# Patient Record
Sex: FEMALE | Race: WHITE | NOT HISPANIC OR LATINO | Employment: FULL TIME | URBAN - METROPOLITAN AREA
[De-identification: names, ages, dates, MRNs, and addresses within clinical notes are randomized per-mention and may not be internally consistent; named-entity substitution may affect disease eponyms.]

---

## 2017-04-23 ENCOUNTER — HOSPITAL ENCOUNTER (EMERGENCY)
Facility: HOSPITAL | Age: 62
Discharge: HOME | End: 2017-04-23
Attending: EMERGENCY MEDICINE
Payer: MEDICAID

## 2017-04-23 VITALS
BODY MASS INDEX: 20.2 KG/M2 | HEIGHT: 61 IN | DIASTOLIC BLOOD PRESSURE: 63 MMHG | RESPIRATION RATE: 12 BRPM | SYSTOLIC BLOOD PRESSURE: 137 MMHG | OXYGEN SATURATION: 98 % | HEART RATE: 96 BPM | TEMPERATURE: 99 F | WEIGHT: 107 LBS

## 2017-04-23 DIAGNOSIS — L03.90 CELLULITIS, UNSPECIFIED CELLULITIS SITE: ICD-10-CM

## 2017-04-23 DIAGNOSIS — L02.11 ABSCESS OF NECK: Primary | ICD-10-CM

## 2017-04-23 PROCEDURE — 25000003 PHARM REV CODE 250: Performed by: PHYSICIAN ASSISTANT

## 2017-04-23 PROCEDURE — 99284 EMERGENCY DEPT VISIT MOD MDM: CPT | Mod: 25

## 2017-04-23 PROCEDURE — 10061 I&D ABSCESS COMP/MULTIPLE: CPT

## 2017-04-23 RX ORDER — HYDROCODONE BITARTRATE AND ACETAMINOPHEN 5; 325 MG/1; MG/1
1 TABLET ORAL 4 TIMES DAILY PRN
Qty: 8 TABLET | Refills: 0 | Status: SHIPPED | OUTPATIENT
Start: 2017-04-23

## 2017-04-23 RX ORDER — SULFAMETHOXAZOLE AND TRIMETHOPRIM 800; 160 MG/1; MG/1
1 TABLET ORAL 2 TIMES DAILY
Qty: 14 TABLET | Refills: 0 | Status: SHIPPED | OUTPATIENT
Start: 2017-04-23 | End: 2017-04-30

## 2017-04-23 RX ORDER — CLOPIDOGREL BISULFATE 75 MG/1
75 TABLET ORAL DAILY
COMMUNITY

## 2017-04-23 RX ORDER — LIDOCAINE HYDROCHLORIDE 10 MG/ML
10 INJECTION, SOLUTION EPIDURAL; INFILTRATION; INTRACAUDAL; PERINEURAL
Status: COMPLETED | OUTPATIENT
Start: 2017-04-23 | End: 2017-04-23

## 2017-04-23 RX ORDER — LIDOCAINE HYDROCHLORIDE 10 MG/ML
INJECTION, SOLUTION EPIDURAL; INFILTRATION; INTRACAUDAL; PERINEURAL
Status: DISCONTINUED
Start: 2017-04-23 | End: 2017-04-23 | Stop reason: WASHOUT

## 2017-04-23 RX ORDER — HYDROCODONE BITARTRATE AND ACETAMINOPHEN 5; 325 MG/1; MG/1
1 TABLET ORAL
Status: COMPLETED | OUTPATIENT
Start: 2017-04-23 | End: 2017-04-23

## 2017-04-23 RX ORDER — NAPROXEN SODIUM 220 MG/1
81 TABLET, FILM COATED ORAL DAILY
COMMUNITY

## 2017-04-23 RX ORDER — BUPROPION HYDROCHLORIDE 300 MG/1
300 TABLET ORAL DAILY
COMMUNITY

## 2017-04-23 RX ORDER — MUPIROCIN 20 MG/G
OINTMENT TOPICAL 3 TIMES DAILY
Qty: 30 G | Refills: 0 | Status: SHIPPED | OUTPATIENT
Start: 2017-04-23 | End: 2017-05-03

## 2017-04-23 RX ADMIN — LIDOCAINE HYDROCHLORIDE 100 MG: 10 INJECTION, SOLUTION EPIDURAL; INFILTRATION; INTRACAUDAL; PERINEURAL at 04:04

## 2017-04-23 RX ADMIN — HYDROCODONE BITARTRATE AND ACETAMINOPHEN 1 TABLET: 5; 325 TABLET ORAL at 04:04

## 2017-04-23 NOTE — ED AVS SNAPSHOT
[facility] [address] [place] [address]               Noemi Gamez   2017  4:11 PM   ED    Description:  Female : 1955   Department:  [facility]           Your Care was Coordinated By:     Provider Role From To    Van Page MD Attending Provider 17 5069 --    Earlene Bunch PA-C Physician Assistant 17 1620 --      Reason for Visit     Abscess           Diagnoses this Visit        Comments    Abscess of neck    -  Primary     Cellulitis, unspecified cellulitis site           ED Disposition     None           To Do List           Follow-up Information     Follow up with Steven Austin MD.    Specialty:  Family Medicine    Why:  for primary care evaluation     Contact information:    [address]  [address]  [place] [address]  [phone]          Follow up with [facility].    Specialty:  Emergency Medicine    Why:  As needed, If symptoms worsen    Contact information:    [address] [place] [id number] [phone]       These Medications        Disp Refills Start End    sulfamethoxazole-trimethoprim 800-160mg (BACTRIM DS) 800-160 mg Tab 14 tablet 0 2017    Take 1 tablet by mouth 2 (two) times daily. - Oral    hydrocodone-acetaminophen 5-325mg (NORCO) 5-325 mg per tablet 8 tablet 0 2017     Take 1 tablet by mouth 4 (four) times daily as needed for Pain. - Oral    mupirocin (BACTROBAN) 2 % ointment 30 g 0 2017 5/3/2017    Apply topically 3 (three) times daily. - Topical (Top)      [facility]     [facility] Care Line -  Assistance  Unless otherwise directed by your provider, please contact [facility], our nurse care line that is available for  assistance.     Registered nurses in the [facility] provide: appointment scheduling, clinical advisement, health education, and other  advisory services.  Call: [phone] (toll free)               Medications           Message regarding Medications     Verify the changes and/or additions to your medication regime listed below are the same as discussed with your clinician today.  If any of these changes or additions are incorrect, please notify your healthcare provider.        START taking these NEW medications        Refills    sulfamethoxazole-trimethoprim 800-160mg (BACTRIM DS) 800-160 mg Tab 0    Sig: Take 1 tablet by mouth 2 (two) times daily.    Class: Print    Route: Oral    hydrocodone-acetaminophen 5-325mg (NORCO) 5-325 mg per tablet 0    Sig: Take 1 tablet by mouth 4 (four) times daily as needed for Pain.    Class: Print    Route: Oral    mupirocin (BACTROBAN) 2 % ointment 0    Sig: Apply topically 3 (three) times daily.    Class: Print    Route: Topical (Top)      These medications were administered today        Dose Freq    lidocaine (PF) 10 mg/ml (1%) injection 100 mg 10 mL ED 1 Time    Sig: 10 mLs (100 mg total) by Infiltration route ED 1 Time.    Class: Normal    Route: Infiltration    hydrocodone-acetaminophen 5-325mg per tablet 1 tablet 1 tablet ED 1 Time    Sig: Take 1 tablet by mouth ED 1 Time.    Class: Normal    Route: Oral      STOP taking these medications     PREGABALIN (LYRICA ORAL) Take by mouth.    tramadol (ULTRAM) 50 mg tablet Take 1 tablet every 4-6 hours as needed for severe pain.    lovastatin (MEVACOR) 20 MG tablet     meloxicam (MOBIC) 7.5 MG tablet            Verify that the below list of medications is an accurate representation of the medications you are currently taking.  If none reported, the list may be blank. If incorrect, please contact your healthcare provider. Carry this list with you in case of emergency.           Current Medications     aspirin 81 MG Chew Take 81 mg by mouth once daily.    buPROPion (WELLBUTRIN XL) 300 MG 24 hr tablet Take 300 mg by mouth once daily.    clopidogrel (PLAVIX) 75 mg  "tablet Take 75 mg by mouth once daily.    hydrocodone-acetaminophen 5-325mg (NORCO) 5-325 mg per tablet Take 1 tablet by mouth 4 (four) times daily as needed for Pain.    ibuprofen (ADVIL,MOTRIN) 800 MG tablet     mupirocin (BACTROBAN) 2 % ointment Apply topically 3 (three) times daily.    sulfamethoxazole-trimethoprim 800-160mg (BACTRIM DS) 800-160 mg Tab Take 1 tablet by mouth 2 (two) times daily.           Clinical Reference Information           Your Vitals Were     BP Pulse Temp Resp Height Weight    137/63 (BP Location: Right arm, Patient Position: Sitting) 96 99.1 °F (37.3 °C) (Oral) 12 5' 1" (1.549 m) 48.5 kg (107 lb)    SpO2 BMI             98% 20.22 kg/m2         Allergies as of 4/23/2017     No Known Allergies      Immunizations Administered on Date of Encounter - 4/23/2017     None      ED Micro, Lab, POCT     None      ED Imaging Orders     None      Discharge References/Attachments     ABSCESS, INCISION AND DRAINAGE (ENGLISH)      [facility] Sign-Up     Activating your [facility] account is as easy as 1-2-3!     1) Visit [email or web address], select Sign Up Now, enter this activation code and your date of birth, then select Next.  [email or web address]  Expires: 6/7/2017  5:23 PM      2) Create a username and password to use when you visit [facility] in the future and select a security question in case you lose your password and select Next.    3) Enter your e-mail address and click Sign Up!    Additional Information  If you have questions, please e-mail [email or web address] or call [phone] to talk to our [facility] staff. Remember, [facility] is NOT to be used for urgent needs. For medical emergencies, dial 911.         Smoking Cessation     If you would like to quit smoking:   You may be eligible for free services if you are a [place] resident and started smoking cigarettes before September 1, 1988.  Call the Smoking Cessation Trust ([facility]) toll free at ([phone] [phone] or ([phone] [phone].   Call " 1-800-QUIT-NOW if you do not meet the above criteria.   Contact us via email: [email or web address]   View our website for more information: [email or web address]         [facility] complies with applicable Federal civil rights laws and does not discriminate on the basis of race, color, national origin, age, disability, or sex.        Language Assistance Services     ATTENTION: Language assistance services are available, free of charge. Please call [phone].      ATENCIÓN: Si habla español, tiene a styles disposición servicios gratuitos de asistencia lingüística. Llame al [phone].     CHÚ Ý: N?u b?n nói Ti?ng Vi?t, có các d?ch v? h? tr? ngôn ng? mi?n phí dành cho b?n. G?i s? [phone].

## 2017-04-23 NOTE — ED PROVIDER NOTES
"Encounter Date: 2017    SCRIBE #1 NOTE: I, Chastity Cuellar , am scribing for, and in the presence of,  Earlene CURRY. I have scribed the entire note.       History     Chief Complaint   Patient presents with    Abscess     neck     Review of patient's allergies indicates:  No Known Allergies  HPI Comments:     2017  4:28 PM     Chief Complaint: Abscess       The patient is a 61 y.o. female with a PMHx of CAD and HTN who is presenting with the acute onset of a worsening abscess localized to the posterior neck x 2 days. Pt reports that she may have been "bitten by a spider" and endorses associated tenderness, erythema, warmth, and induration to the area. No exacerbating or mitigating factors. She denies recent fevers. No pertinent past surgical hx.           The history is provided by the patient and medical records.     Past Medical History:   Diagnosis Date    Coronary artery disease     Hypertension      Past Surgical History:   Procedure Laterality Date    ABCESS DRAINAGE       SECTION      cyst removed      sacral region     Family History   Problem Relation Age of Onset    Cancer Mother      Social History   Substance Use Topics    Smoking status: Former Smoker    Smokeless tobacco: None      Comment: quit in     Alcohol use No     Review of Systems   Constitutional: Negative for fever.   HENT: Negative for sore throat.    Eyes: Negative for visual disturbance.   Respiratory: Negative for shortness of breath.    Cardiovascular: Negative for chest pain.   Gastrointestinal: Negative for nausea.   Genitourinary: Negative for dysuria.   Musculoskeletal: Negative for back pain.   Skin: Positive for color change and wound. Negative for rash.   Neurological: Negative for weakness.   Hematological: Does not bruise/bleed easily.   Psychiatric/Behavioral: Negative for confusion.       Physical Exam   Initial Vitals   BP Pulse Resp Temp SpO2   17 1604 17 1604 17 1604 " 04/23/17 1604 04/23/17 1604   137/63 96 12 99.1 °F (37.3 °C) 98 %     Physical Exam    Nursing note and vitals reviewed.  Constitutional: She appears well-developed and well-nourished. She is not diaphoretic. No distress.   HENT:   Head: Normocephalic and atraumatic.   Neck: Erythema present.       Erythema and induration noted to left-sided posterior neck.  Central area of scabbing, but no active bleeding or discharge.   Cardiovascular: Intact distal pulses.   Musculoskeletal: Normal range of motion. She exhibits tenderness.   Neurological: She is alert and oriented to person, place, and time. She has normal strength. No sensory deficit.   Skin: Skin is warm and dry. No rash and no abscess noted. No erythema.              ED Course   I & D - Incision and Drainage  Date/Time: 4/23/2017 10:43 PM  Performed by: DANNI VARGAS  Authorized by: SEBAS MIR   Type: abscess  Body area: head/neck (Left posterior neck)  Anesthesia: local infiltration    Anesthesia:  Anesthesia: local infiltration  Local Anesthetic: lidocaine 1% without epinephrine   Anesthetic total: 3 mL  Patient sedated: no  Scalpel size: 11  Incision type: single straight  Complexity: complex  Drainage: serosanguinous  Drainage amount: moderate  Wound treatment: incision,  drainage,  deloculation,  wound packed and  expression of material  Packing material: 1/4 in gauze  Patient tolerance: Patient tolerated the procedure well with no immediate complications        Labs Reviewed - No data to display                APC / Resident Notes:   She tolerated the incision and drainage well.  She is discharged home on Bactrim and topical Bactroban.  She is encouraged follow-up with her PCP for reevaluation in the next couple of days.  She voices understanding and is agreeable to the plan.  She is given specific return precautions.       Scribe Attestation:   Scribe #1: I performed the above scribed service and the documentation accurately describes  the services I performed. I attest to the accuracy of the note.    Attending Attestation:     Physician Attestation Statement for NP/PA:   I have conducted a face to face encounter with this patient in addition to the NP/PA, due to Patient Request    Other NP/PA Attestation Additions:      Medical Decision Making: Noemi Gamez is a 61 y.o. female presenting with right lateral posterior neck abscess marked by overlying cellulitis.  Ultrasound confirms small fluid collection treated with incision and drainage here by ABC and a my supervision with adequate evacuation.  No sign of deep space extension on exam or ultrasound.  No midline tenderness to palpation of the neck.  I do not think further neck imaging is indicated.  Antibiotics initiated for additional treatment pending outpatient follow-up.  Detailed return precautions reviewed.  I do not think the patient requires admission for IV antibiotics at this point.       Physician Attestation for Scribe:  Physician Attestation Statement for Scribe #1: I, Dr. Page , reviewed documentation, as scribed by Chastity Cuellar  in my presence, and it is both accurate and complete.                 ED Course     Clinical Impression:   The primary encounter diagnosis was Abscess of neck. A diagnosis of Cellulitis, unspecified cellulitis site was also pertinent to this visit.          Earlene Bunch PA-C  04/23/17 2265

## 2018-12-07 ENCOUNTER — TELEPHONE (OUTPATIENT)
Dept: PULMONOLOGY | Facility: CLINIC | Age: 63
End: 2018-12-07

## 2018-12-07 NOTE — TELEPHONE ENCOUNTER
Attempted to contact pt. No voicemail box available. Will try again on Monday.   ----- Message from Easton Day MD sent at 12/7/2018  4:36 PM CST -----  Need to bring in Monday evaluation- has masses, needs ct chest on disc for visit, likely will do bronch Tuesday.  Her cell number should be [phone]- need to be sure she brings ct chest on disc.

## 2018-12-07 NOTE — TELEPHONE ENCOUNTER
Attempted to contact son. No answer. Left message with call back number. No further action needed at this time.

## 2018-12-09 ENCOUNTER — TELEPHONE (OUTPATIENT)
Dept: HEMATOLOGY/ONCOLOGY | Facility: CLINIC | Age: 63
End: 2018-12-09

## 2018-12-10 ENCOUNTER — LAB VISIT (OUTPATIENT)
Dept: LAB | Facility: HOSPITAL | Age: 63
End: 2018-12-10
Attending: INTERNAL MEDICINE
Payer: COMMERCIAL

## 2018-12-10 ENCOUNTER — OFFICE VISIT (OUTPATIENT)
Dept: PULMONOLOGY | Facility: CLINIC | Age: 63
End: 2018-12-10
Payer: COMMERCIAL

## 2018-12-10 VITALS
HEIGHT: 61 IN | HEART RATE: 75 BPM | OXYGEN SATURATION: 99 % | BODY MASS INDEX: 20.85 KG/M2 | WEIGHT: 110.44 LBS | DIASTOLIC BLOOD PRESSURE: 81 MMHG | SYSTOLIC BLOOD PRESSURE: 146 MMHG

## 2018-12-10 DIAGNOSIS — J18.9 PNEUMONIA OF RIGHT LOWER LOBE DUE TO INFECTIOUS ORGANISM: ICD-10-CM

## 2018-12-10 DIAGNOSIS — R93.89 ABNORMAL CT OF THE CHEST: ICD-10-CM

## 2018-12-10 DIAGNOSIS — F41.9 ANXIETY: ICD-10-CM

## 2018-12-10 DIAGNOSIS — J44.0 COPD WITH ACUTE LOWER RESPIRATORY INFECTION: ICD-10-CM

## 2018-12-10 DIAGNOSIS — R91.8 LUNG MASS: ICD-10-CM

## 2018-12-10 DIAGNOSIS — R91.8 LUNG MASS: Primary | ICD-10-CM

## 2018-12-10 LAB
CREAT SERPL-MCNC: 0.8 MG/DL
EST. GFR  (AFRICAN AMERICAN): >60 ML/MIN/1.73 M^2
EST. GFR  (NON AFRICAN AMERICAN): >60 ML/MIN/1.73 M^2

## 2018-12-10 PROCEDURE — 99999 PR PBB SHADOW E&M-EST. PATIENT-LVL IV: CPT | Mod: PBBFAC,,, | Performed by: INTERNAL MEDICINE

## 2018-12-10 PROCEDURE — 82565 ASSAY OF CREATININE: CPT

## 2018-12-10 PROCEDURE — 36415 COLL VENOUS BLD VENIPUNCTURE: CPT

## 2018-12-10 PROCEDURE — 99205 OFFICE O/P NEW HI 60 MIN: CPT | Mod: S$GLB,,, | Performed by: INTERNAL MEDICINE

## 2018-12-10 RX ORDER — AMOXICILLIN AND CLAVULANATE POTASSIUM 875; 125 MG/1; MG/1
1 TABLET, FILM COATED ORAL
COMMUNITY
Start: 2018-12-07 | End: 2018-12-17

## 2018-12-10 RX ORDER — CIPROFLOXACIN 500 MG/1
TABLET ORAL
Refills: 0 | COMMUNITY
Start: 2018-12-07

## 2018-12-10 RX ORDER — TRAMADOL HYDROCHLORIDE 50 MG/1
TABLET ORAL
Refills: 0 | COMMUNITY
Start: 2018-12-08

## 2018-12-10 RX ORDER — ALPRAZOLAM 0.25 MG/1
0.25 TABLET ORAL 3 TIMES DAILY PRN
Qty: 21 TABLET | Refills: 0 | Status: SHIPPED | OUTPATIENT
Start: 2018-12-10 | End: 2019-01-09

## 2018-12-10 NOTE — PATIENT INSTRUCTIONS
Will do scope 12/11- will inspect airways and take biopsy from right lower lung 3-4 different areas.    Blood test suggest some bone abnormality?  Would check bone scan.    Would also recommend brain scan    Would recommend xanax - can only give a week.  Will give on paper script- could ask primary to order different?

## 2018-12-10 NOTE — PROGRESS NOTES
"12/10/2018    Noemi Gamez  New Patient Consult    Chief Complaint   Patient presents with    Follow-up     appt per Dr. Day to bronch for tomorrow       HPI: pt no asbestos, smokes, presented  with low grade fever, sob- worsened on oral abx(azithro).  Admitted to [facility] and found to have pneumonia with obstruction suggested.  Ct from 12/3/2018      Pt was seen by pain clinic last dec with pos drug screen- turned down for rx.    Labs from [facility] [facility] show high alk phos.      Pt denies bone pain or headache or neuro defects.  Has neb - no great help. No severe breathing problems , tried stop smokes past.        The chief compliant  problem is new to me",    PFSH:  Past Medical History:   Diagnosis Date    Coronary artery disease     Hypertension          Past Surgical History:   Procedure Laterality Date    ABCESS DRAINAGE       SECTION      cyst removed      sacral region     Social History     Tobacco Use    Smoking status: Former Smoker    Tobacco comment: quit in    Substance Use Topics    Alcohol use: No    Drug use: Not on file     Family History   Problem Relation Age of Onset    Cancer Mother      Review of patient's allergies indicates:  No Known Allergies    Performance Status:The patient's activity level is functions out of house.      Review of Systems:  a review of eleven systems covering constitutional, Eye, HEENT, Psych, Respiratory, Cardiac, GI, , Musculoskeletal, Endocrine, Dermatologic was negative except for pertinent findings as listed ABOVE and below:  pertinent positive as above, rest is good       Exam:Comprehensive exam done. BP (!) 146/81 (BP Location: Right arm, Patient Position: Sitting)   Pulse 75   Ht 5' 1" (1.549 m)   Wt 50.1 kg (110 lb 7.2 oz)   SpO2 99% Comment: on room air  BMI 20.87 kg/m²   Exam included Vitals as listed, and patient's appearance and affect and alertness and mood, oral exam for yeast and hygiene and pharynx lesions and " Mallapatti (M) score, neck with inspection for jvd and masses and thyroid abnormalities and lymph nodes (supraclavicular and infraclavicular nodes and axillary also examined and noted if abn), chest exam included symmetry and effort and fremitus and percussion and auscultation, cardiac exam included rhythm and gallops and murmur and rubs and jvd and edema, abdominal exam for mass and hepatosplenomegaly and tenderness and hernias and bowel sounds, Musculoskeletal exam with muscle tone and posture and mobility/gait and  strength, and skin for rashes and cyanosis and pallor and turgor, extremity for clubbing.  Findings were normal except for pertinent findings listed below:  Slender, M2, chest is symmetric, no distress, normal percussion, normal fremitus and good normal breath sounds      Radiographs (ct chest and cxr) reviewed: view by direct vision  - over 8 nodules fairly large and spiculated.  rll sup seg infiltrate and both subsegmental airways with cut off.  Airway of rll ant and lateral segments abn.    Labs reviewed            PFT was not done       Plan:  Clinical impression is apparently straight forward and impression with management as below.     Noemi was seen today for follow-up.    Diagnoses and all orders for this visit:    Lung mass  -     Case request GI: Bronchoscopy- need [facility], noon, could do early am?  -     NM Bone Scan Whole Body; Future  -     ALPRAZolam (XANAX) 0.25 MG tablet; Take 1 tablet (0.25 mg total) by mouth 3 (three) times daily as needed for Anxiety.  -     MRI Brain W WO Contrast; Future  -     Creatinine, serum; Future    Anxiety  -     ALPRAZolam (XANAX) 0.25 MG tablet; Take 1 tablet (0.25 mg total) by mouth 3 (three) times daily as needed for Anxiety.    Pneumonia of right lower lobe due to infectious organism    Abnormal CT of the chest    COPD with acute lower respiratory infection        Follow-up in about 2 weeks (around 12/24/2018), or if symptoms worsen or fail to  improve.    Discussed with patient above for education the following:      Patient Instructions   Will do scope 12/11- will inspect airways and take biopsy from right lower lung 3-4 different areas.    Blood test suggest some bone abnormality?  Would check bone scan.    Would also recommend brain scan    Would recommend xanax - can only give a week.  Will give on paper script- could ask primary to order different?

## 2018-12-19 ENCOUNTER — TELEPHONE (OUTPATIENT)
Dept: PULMONOLOGY | Facility: CLINIC | Age: 63
End: 2018-12-19

## 2018-12-19 ENCOUNTER — TELEPHONE (OUTPATIENT)
Dept: HEMATOLOGY/ONCOLOGY | Facility: CLINIC | Age: 63
End: 2018-12-19

## 2018-12-19 NOTE — TELEPHONE ENCOUNTER
Sent message to Morris meza for PA to be worked.     ----- Message from Cristal Romero sent at 12/19/2018  9:22 AM CST -----  Contact: Luciana with [facility]  Type: Needs Medical Advice    Who Called:  Luciana with [facility]    Best Call Back Number: [phone]  Additional Information: Patient is scheduled for a Nuc Med Bone Scan whole body on Friday, 12/21/18 at [facility].  Before this test, they need an authorization for [employer] [place] (insurance).    CPT code 32996  Diagnosis code R 991.8    Please call to advise  Thank you

## 2018-12-19 NOTE — TELEPHONE ENCOUNTER
Call [facility] for results of path report on lung bx, bronchoscopy, Dr. Day 12/11/18.

## 2018-12-29 ENCOUNTER — TELEPHONE (OUTPATIENT)
Dept: HEMATOLOGY/ONCOLOGY | Facility: CLINIC | Age: 63
End: 2018-12-29

## 2018-12-30 NOTE — TELEPHONE ENCOUNTER
She no showed for appt 12/27/18.  Re schedule her to come in 1/2 or 1/3.  Pic office.  I need to go over bx reports.